# Patient Record
Sex: MALE | Race: WHITE | NOT HISPANIC OR LATINO | URBAN - METROPOLITAN AREA
[De-identification: names, ages, dates, MRNs, and addresses within clinical notes are randomized per-mention and may not be internally consistent; named-entity substitution may affect disease eponyms.]

---

## 2021-03-19 ENCOUNTER — EMERGENCY (EMERGENCY)
Facility: HOSPITAL | Age: 36
LOS: 1 days | Discharge: HOME | End: 2021-03-19
Attending: EMERGENCY MEDICINE
Payer: MEDICAID

## 2021-03-19 VITALS
SYSTOLIC BLOOD PRESSURE: 131 MMHG | OXYGEN SATURATION: 98 % | RESPIRATION RATE: 18 BRPM | TEMPERATURE: 98 F | DIASTOLIC BLOOD PRESSURE: 79 MMHG | HEART RATE: 128 BPM

## 2021-03-19 PROCEDURE — 99283 EMERGENCY DEPT VISIT LOW MDM: CPT

## 2021-03-19 RX ORDER — AMOXICILLIN 250 MG/5ML
1 SUSPENSION, RECONSTITUTED, ORAL (ML) ORAL
Qty: 30 | Refills: 0
Start: 2021-03-19 | End: 2021-03-28

## 2021-03-19 RX ORDER — AMOXICILLIN 250 MG/5ML
500 SUSPENSION, RECONSTITUTED, ORAL (ML) ORAL ONCE
Refills: 0 | Status: COMPLETED | OUTPATIENT
Start: 2021-03-19 | End: 2021-03-19

## 2021-03-19 RX ADMIN — Medication 500 MILLIGRAM(S): at 18:55

## 2021-03-19 NOTE — ED ADULT NURSE NOTE - SUICIDE SCREENING DEPRESSION
[facility]            (For Outpatient Use Only) Initial Admit Date: 8/23/2017   Inpt/Obs Admit Date: Inpt: N/A / Obs: N/A   Discharge Date:    Hospital Acct:  [de-identified]   MRN: [de-identified]   CSN: [id number]        ENCOUNTER  Patient Class: OU Subscriber Name:  Sofia Bunbirdie :    Subscriber ID:  Pt Rel to Subscriber:    Hospital Account Financial Class: [facility] [place]    2017 Negative

## 2021-03-19 NOTE — ED PROVIDER NOTE - PATIENT PORTAL LINK FT
You can access the FollowMyHealth Patient Portal offered by [facility] by registering at the following website: [email or web address]. By joining [employer]’s FollowMyHealth portal, you will also be able to view your health information using other applications (apps) compatible with our system.

## 2021-03-19 NOTE — ED PROVIDER NOTE - CLINICAL SUMMARY MEDICAL DECISION MAKING FREE TEXT BOX
35 year old male presenting to the ED with complaints of left sided dental pain. Will give oral antibiotics and discharge.

## 2021-03-19 NOTE — ED PROVIDER NOTE - OBJECTIVE STATEMENT
35 year old male presenting to the ED with complaints of left lower wisdom tooth pain since yesterday. Patient has an appointment with his dentist on 3/21. No reported fever, no chills, no drooling, or any other complaints. States that he does not want to take any pain medication but would like to receive Amoxicillin.

## 2021-04-03 ENCOUNTER — EMERGENCY (EMERGENCY)
Facility: HOSPITAL | Age: 36
LOS: 1 days | Discharge: HOME | End: 2021-04-03
Attending: STUDENT IN AN ORGANIZED HEALTH CARE EDUCATION/TRAINING PROGRAM
Payer: MEDICAID

## 2021-04-03 VITALS
RESPIRATION RATE: 18 BRPM | OXYGEN SATURATION: 100 % | WEIGHT: 157.85 LBS | HEART RATE: 68 BPM | DIASTOLIC BLOOD PRESSURE: 73 MMHG | TEMPERATURE: 98 F | SYSTOLIC BLOOD PRESSURE: 109 MMHG

## 2021-04-03 PROCEDURE — 99283 EMERGENCY DEPT VISIT LOW MDM: CPT

## 2021-04-03 RX ADMIN — Medication 1 TABLET(S): at 11:20

## 2021-04-03 NOTE — ED PROVIDER NOTE - OBJECTIVE STATEMENT
36yo recently treated with 10 day course of amoxicillin for infection on right lower wisdom tooth. Patient improved with meds, but never followed with dentist so developed pain and discharge from site again. Says will go to dentist tomorrow but needs antibiotics.

## 2021-04-03 NOTE — ED PROVIDER NOTE - NSFOLLOWUPINSTRUCTIONS_ED_ALL_ED_FT
You were seen in the ER for a dental abscess. Since there is a lot of surrounding swelling, we will start you on antibiotics. But you cannot keep taking antibiotics as a solution. The wisdom tooth must be extracted.

## 2021-04-03 NOTE — ED PROVIDER NOTE - CLINICAL SUMMARY MEDICAL DECISION MAKING FREE TEXT BOX
Will give 7 day course of augmentin and urging patient to go to dentist tomorrow morning. Explained that patient cannot keep taking antibiotics and that tooth must be extracted. Patient understands and will follow up with dentist.

## 2021-04-03 NOTE — ED PROVIDER NOTE - PATIENT PORTAL LINK FT
You can access the FollowMyHealth Patient Portal offered by [facility] by registering at the following website: [email or web address]. By joining [employer]’s FollowMyHealth portal, you will also be able to view your health information using other applications (apps) compatible with our system.

## 2021-04-10 ENCOUNTER — EMERGENCY (EMERGENCY)
Facility: HOSPITAL | Age: 36
LOS: 1 days | Discharge: HOME | End: 2021-04-10
Attending: STUDENT IN AN ORGANIZED HEALTH CARE EDUCATION/TRAINING PROGRAM
Payer: MEDICAID

## 2021-04-10 VITALS
WEIGHT: 153 LBS | DIASTOLIC BLOOD PRESSURE: 74 MMHG | TEMPERATURE: 98 F | RESPIRATION RATE: 18 BRPM | HEART RATE: 116 BPM | SYSTOLIC BLOOD PRESSURE: 116 MMHG | OXYGEN SATURATION: 98 %

## 2021-04-10 PROCEDURE — 99282 EMERGENCY DEPT VISIT SF MDM: CPT

## 2021-04-10 NOTE — ED PROVIDER NOTE - PATIENT PORTAL LINK FT
You can access the FollowMyHealth Patient Portal offered by [facility] by registering at the following website: [email or web address]. By joining [employer]’s FollowMyHealth portal, you will also be able to view your health information using other applications (apps) compatible with our system.

## 2021-04-10 NOTE — ED ADULT NURSE NOTE - OBJECTIVE STATEMENT
pt is a 34 y/o male with c/o   needs more antibiotic for his rt gum where wisdom tooth was extracted. pt states he thinks it is infected. pt  seen and examined by MD  no  signs of any  infection as the MD.

## 2021-04-10 NOTE — ED PROVIDER NOTE - CLINICAL SUMMARY MEDICAL DECISION MAKING FREE TEXT BOX
S/p wisdom tooth extraction w/no signs of infection although pt is asking for more amoxicillin. Explained to pt that he does not have a current infection and it is not appropriate to give him amoxicillin. Pt told he needs to f/u with dentist.

## 2021-04-10 NOTE — ED PROVIDER NOTE - ENMT, MLM
Airway patent, Nasal mucosa clear. Mouth with normal mucosa. Throat has no vesicles, no oropharyngeal exudates and uvula is midline. Extraction site looks clean. No exudates, no erythema in the area. Not painful to touch.

## 2021-04-10 NOTE — ED PROVIDER NOTE - OBJECTIVE STATEMENT
36 y/o M pt with a PMHx of Anxiety presents to ED s/p R sided wisdom tooth extraction 4 days ago. Pt was prescribed 3 days of amoxicillin by dentist and mouth rinses as well as ibuprofen for pain but pt came in today believing he needs more abx. Pt was seen by me on 4/3 and was given Augmentin until seeing his dentist. Prior to seeing me, pt had gotten 10 days of amoxicillin from a different provider and is now c/o pain at site but is able to tolerate food. Pt denies purulence d/c, fever, chills, nausea, vomiting, radiation of pain. NKDA.

## 2023-03-31 ENCOUNTER — EMERGENCY (EMERGENCY)
Facility: HOSPITAL | Age: 38
LOS: 1 days | Discharge: HOME | End: 2023-03-31
Attending: STUDENT IN AN ORGANIZED HEALTH CARE EDUCATION/TRAINING PROGRAM
Payer: MEDICAID

## 2023-03-31 VITALS
DIASTOLIC BLOOD PRESSURE: 78 MMHG | TEMPERATURE: 98 F | OXYGEN SATURATION: 97 % | HEART RATE: 78 BPM | HEIGHT: 75 IN | WEIGHT: 160.06 LBS | RESPIRATION RATE: 18 BRPM | SYSTOLIC BLOOD PRESSURE: 102 MMHG

## 2023-03-31 PROCEDURE — 99283 EMERGENCY DEPT VISIT LOW MDM: CPT

## 2023-03-31 PROCEDURE — 99284 EMERGENCY DEPT VISIT MOD MDM: CPT

## 2023-03-31 RX ORDER — OXYCODONE AND ACETAMINOPHEN 5; 325 MG/1; MG/1
1 TABLET ORAL ONCE
Refills: 0 | Status: DISCONTINUED | OUTPATIENT
Start: 2023-03-31 | End: 2023-03-31

## 2023-03-31 RX ADMIN — Medication 1 TABLET(S): at 20:03

## 2023-03-31 NOTE — ED PROVIDER NOTE - NS ED ROS FT
Positive: Gum pain, left cheek swelling  Negative: Fever, chills, congestion, cough, chest pain, shortness of breath, nasal congestion, rhinorrhea, dysphonia, nausea, vomiting, diarrhea, abdominal pain, dysuria, hematuria, leg edema, rash.

## 2023-03-31 NOTE — ED PROVIDER NOTE - CLINICAL SUMMARY MEDICAL DECISION MAKING FREE TEXT BOX
37-year-old male with no past medical history, no past surgical history, no home medications, presents with 4-day history of left upper gum pain. On exam, blood pressure 102/78, vital signs otherwise within normal limits, poor dentition and left upper molar, however no periapical abscess visualized, no cheek swelling appreciated.    DDx: Dental abscess versus dental carry.  No airway compromise, no evidence of ANUG.  No drainable abscess visualized.    Plan:  –Augmentin  –Percocet  –Discharge with dental follow-up.

## 2023-03-31 NOTE — ED PROVIDER NOTE - NSFOLLOWUPINSTRUCTIONS_ED_ALL_ED_FT
1. You were seen for tooth pain. A copy of any of your resulted labs, imaging, and findings have been provided to you. Make sure to view any test results that may not have yet resulted at the time of your discharge by creating a FollowMyHealth account at: [email or web address] to sign up for FollowMyHealth. Please review all of your lab, imaging, and all other results in their entirety with your primary care doctor.   2. Continue to take your home medications as prescribed. Take over the counter motrin 600 mg with food every six hours (do not exceed 3,200 mg in a 24 hour period) and supplement with tylenol 650 mg every six hours (do not exceed 4000 mg of tylenol in a 24 hour period and do not drink alcohol while taking tylenol) between the motrin dosages to have a layered effect. Consult a pharmacist or your primary care doctor with any questions.  augmentin from your pharmacy and take the medication as prescribed on the bottle's manufacterer's label, and consult a pharmacist or your primary care doctor with any questions.   3. Follow up with a dentist and your primary care doctor within 48 hours to assess the symptoms you were seen for in the emergency department and to review all results from your visit. If you don't have a doctor, call [phone] to make an appointment.  4. Return immediately to the emergency department for new, persistent, or worsening symptoms or signs. Return immediately to the emergency department if you have chest pain, shortness of breath, loss of consciousness, fever, trouble speaking or swallowing, or neck swelling, or drooling.   5. For your for health, you should make healthy food choices and be physically active. Also, you should not smoke or use drugs, and you should not drink alcohol in excess. Please visit [email or web address] for resources and more information.

## 2023-03-31 NOTE — ED PROVIDER NOTE - PATIENT PORTAL LINK FT
You can access the FollowMyHealth Patient Portal offered by [facility] by registering at the following website: [email or web address]. By joining [employer]’s FollowMyHealth portal, you will also be able to view your health information using other applications (apps) compatible with our system.

## 2023-03-31 NOTE — ED PROVIDER NOTE - PHYSICAL EXAMINATION
GENERAL: Well appearing, well nourished, awake, alert and in NAD  ENMT: Airway patent, Uvula midline and nonedematous, airway patent, no periapical abscesses visualized.  No stridor, trismus, drooling, dysphonia.  No tongue elevation, no tongue or lip swelling.  No submandibular swelling.  Patient has poor dentition and left upper molar, no swelling or erythema of left cheek appreciated on my exam.  Speech clear.  EYES: conjunctiva clear  CARDIAC: Regular rate, regular rhythm.  Heart sounds S1, S2, no S3, S4. No murmurs, rubs or gallops.  RESPIRATORY: Breath sounds clear and equal in bilateral anterior lung fields, no wheezes/ronchi/stridor; pt breathing and speaking comfortably with no increased WOB, no accessory mm. use, no intercostal retractions, no nasal flaring  GI: abdomen soft, non-distended, non-tender, no rebound or guarding.  SKIN: warm and dry, no rashes  PSYCH: awake, alert, calm and cooperative

## 2023-03-31 NOTE — ED PROVIDER NOTE - OBJECTIVE STATEMENT
37-year-old male with no past medical history, no past surgical history, no home medications, presents with 4-day history of left upper gum pain.  Patient states he believes his left cheek appears swollen more than normal.  Patient states he has had dental infections before that were treated successfully with amoxicillin.  Patient denies any recent dental surgeries or procedures.  Patient states he is trying to arrange dental follow-up but he had increased pain.  Patient denies dysphonia, fever, chills, trouble speaking or swallowing.

## 2023-04-01 PROBLEM — F41.9 ANXIETY DISORDER, UNSPECIFIED: Chronic | Status: ACTIVE | Noted: 2021-04-10

## 2024-11-21 NOTE — ED ADULT NURSE NOTE - NS ED NURSE DC INFO COMPLEXITY
(2) Male
Simple: Patient demonstrates quick and easy understanding/Returned Demonstration/Verbalized Understanding